# Patient Record
Sex: MALE
[De-identification: names, ages, dates, MRNs, and addresses within clinical notes are randomized per-mention and may not be internally consistent; named-entity substitution may affect disease eponyms.]

---

## 2018-07-16 ENCOUNTER — HOSPITAL ENCOUNTER (EMERGENCY)
Dept: HOSPITAL 42 - ED | Age: 48
Discharge: HOME | End: 2018-07-16
Payer: MEDICAID

## 2018-07-16 VITALS — BODY MASS INDEX: 29.7 KG/M2

## 2018-07-16 VITALS — TEMPERATURE: 98.7 F

## 2018-07-16 VITALS — OXYGEN SATURATION: 99 % | RESPIRATION RATE: 18 BRPM

## 2018-07-16 VITALS — HEART RATE: 72 BPM | DIASTOLIC BLOOD PRESSURE: 73 MMHG | SYSTOLIC BLOOD PRESSURE: 129 MMHG

## 2018-07-16 DIAGNOSIS — L30.9: Primary | ICD-10-CM

## 2018-07-16 DIAGNOSIS — L03.112: ICD-10-CM

## 2018-07-16 NOTE — ED PDOC
Arrival/HPI





- General


Chief Complaint: Abnormal Skin Integrity


Time Seen by Provider: 07/16/18 20:06


Historian: Patient





- History of Present Illness


Narrative History of Present Illness (Text): 





07/16/18 20:58


48 year old male, with no significant past medical history, presents to the 

Emergency department complaining of rash to the left armpit area since 

Saturday. Patient describes a burning sensation to the area but denies any 

itching. Patient denies any rash elsewhere on the body, fever, chills, nausea, 

vomiting, diarrhea, abdominal pain, chest pain, shortness of breath or any 

other complaints. Patient presents to the Emergency department for medical 

evaluation. 





Time/Duration: Other (Saturday)


Symptom Onset: Gradual


Symptom Course: Unchanged


Quality: Burning





Past Medical History





- Provider Review


Nursing Documentation Reviewed: Yes





- Psychiatric


Hx Substance Use: No





- Anesthesia


Hx Anesthesia: No


Hx Anesthesia Reactions: No


Hx Malignant Hyperthermia: No





Family/Social History





- Physician Review


Nursing Documentation Reviewed: Yes


Family/Social History: No Known Family HX


Smoking Status: Never Smoked


Hx Alcohol Use: Yes


Frequency of alcohol use: Socially


Hx Substance Use: No





Allergies/Home Meds


Allergies/Adverse Reactions: 


Allergies





Unobtainable Allergy (Verified 07/16/18 20:21)


 











Review of Systems





- Physician Review


All systems were reviewed & negative as marked: Yes





- Review of Systems


Constitutional: absent: Fevers


Respiratory: absent: SOB


Cardiovascular: absent: Chest Pain


Gastrointestinal: absent: Abdominal Pain, Diarrhea, Nausea, Vomiting


Skin: Rash (left armpit)





Physical Exam


Vital Signs Reviewed: Yes


Vital Signs











  Temp Pulse Resp BP Pulse Ox


 


 07/16/18 21:28   72  18  129/73  99


 


 07/16/18 21:05  98.7 F  72  18  129/73  99


 


 07/16/18 21:00   79  18  152/89 H  99


 


 07/16/18 20:15  98.8 F  85  18  156/98 H  99











Temperature: Afebrile


Blood Pressure: Normal


Pulse: Regular


Respiratory Rate: Normal


Appearance: Positive for: Well-Appearing, Non-Toxic, Comfortable


Pain Distress: None


Mental Status: Positive for: Alert and Oriented X 3





- Systems Exam


Head: Present: Atraumatic, Normocephalic


Pupils: Present: PERRL


Extroacular Muscles: Present: EOMI


Conjunctiva: Present: Normal


Neck: Present: Normal Range of Motion


Respiratory/Chest: Present: Clear to Auscultation, Good Air Exchange.  No: 

Respiratory Distress, Accessory Muscle Use


Cardiovascular: Present: Regular Rate and Rhythm, Normal S1, S2.  No: Murmurs


Abdomen: No: Tenderness, Distention, Peritoneal Signs


Back: Present: Normal Inspection


Upper Extremity: Present: Normal Inspection.  No: Cyanosis, Edema


Lower Extremity: Present: Normal Inspection.  No: Edema


Neurological: Present: GCS=15, CN II-XII Intact, Speech Normal


Skin: Present: Warm, Dry, Normal Color, Erythematous (to left axillary arm 

area. some scanty blistering overlying the skin noted. No axillary adenopathy 

noted.).  No: Rashes


Psychiatric: Present: Alert, Oriented x 3, Normal Insight, Normal Concentration





Medical Decision Making


ED Course and Treatment: 





07/16/18 20:39


Impression:  48 year old male presents to the Emergency department for 

evaluation of left armpit rash.





Plan:


-- Mupirocin


-- Keflex


-- Benadryl


-- Reassess and disposition





Prior Visits:


Notes and results from previous visits were reviewed.





Progress Notes:











- Medication Orders


Current Medication Orders: 











Discontinued Medications





Cephalexin Monohydrate (Keflex)  500 mg PO ONCE STA


   PRN Reason: Protocol


   Stop: 07/16/18 20:42


   Last Admin: 07/16/18 21:22  Dose: 500 mg





Diphenhydramine HCl (Benadryl)  50 mg PO ONCE ONE


   Stop: 07/16/18 21:01


   Last Admin: 07/16/18 21:22  Dose: 50 mg





Mupirocin (Bactroban Ointment)  0 gm TOP ONCE ONE


   Stop: 07/17/18 20:42











- Scribe Statement


The provider has reviewed the documentation as recorded by the Scribe


Mis Nicole.





All medical record entries made by the Scribe were at my direction and 

personally dictated by me. I have reviewed the chart and agree that the record 

accurately reflects my personal performance of the history, physical exam, 

medical decision making, and the department course for this patient. I have 

also personally directed, reviewed, and agree with the discharge instructions 

and disposition.





Disposition/Present on Arrival





- Present on Arrival


Any Indicators Present on Arrival: No


History of DVT/PE: No


History of Uncontrolled Diabetes: No


Urinary Catheter: No


History of Decub. Ulcer: No


History Surgical Site Infection Following: None





- Disposition


Have Diagnosis and Disposition been Completed?: Yes


Diagnosis: 


 Dermatitis, Cellulitis





Disposition: HOME/ ROUTINE


Disposition Time: 21:00


Patient Plan: Discharge


Condition: STABLE


Discharge Instructions (ExitCare):  Cellulitis (Skin Infection), Adult (DC), 

Cellulitis (ED)


Additional Instructions: 


Medications as prescribed/avoid placing any lotions/antipersperants/deodorants 

to the area/follow up with your doctor this week


Prescriptions: 


Mupirocin 2% Cream [Bactroban 2%] 30 gm EXT BID #1 tube


DiphenhydrAMINE [Benadryl] 50 mg PO Q6 PRN #24 cap


 PRN Reason: Itching / Pruritus


Cephalexin [cephalexin] 500 mg PO BID #14 cap


Referrals: 


PCP,NO [Primary Care Provider] - Follow up with primary


Forms:  [employer] (English)